# Patient Record
(demographics unavailable — no encounter records)

---

## 2025-04-23 NOTE — ASSESSMENT
[FreeTextEntry1] : 77yo female presents with tendinitis of right rotator cuff, impingement of right shoulder, cervicalgia   X-rays and diagnosis discussed with patient - unremarkable  MDP rx sent - r/b/a discussed - instructed how to take medication  instructed to attend PT  if symptoms persist could try LEONA CSI injection  if neck symptoms persist, return to dr. brenner for further workup  use of heat on the neck discussed use of cryotherapy on the shoulder discussed RTC prn    The patient's current medication management of their orthopedic diagnosis was reviewed today: (1) We discussed a comprehensive treatment plan that included pharmaceutical management involving the use of prescription medications. (2) There is a moderate risk of morbidity with further treatment, especially from use of prescription strength medications and possible side effects of these medications which include upset stomach with oral medications, skin reactions to topical medications and cardiac/renal/diabetes issues with long term use. (3) I recommended that the patient follow-up with their medical physician to discuss any significant specific potential issues with long term medication use such as interactions with current medications or with exacerbation of underlying medical comorbidities. (4) The benefits and risks associated with use of injectable, oral or topical, prescription and over the counter anti-inflammatory medications were discussed with the patient. The patient voiced understanding of the risks including but not limited to bleeding, stroke, kidney dysfunction, heart disease, and were referred to the black box warning label for further information.  I am working today under the supervision of Dr. Espinal and I am following the plan of care of Dr. Espinal as described by him on this date. Progress note completed by Enma Gaona PA-C under the supervision of Dr. Espinal.

## 2025-04-23 NOTE — HISTORY OF PRESENT ILLNESS
[9] : 9 [5] : 5 [Dull/Aching] : dull/aching [de-identified] : 4/23/25: patient states she has pain in her right shoulder for a few months, no injury  [FreeTextEntry1] : right shoulder

## 2025-04-23 NOTE — IMAGING
[Right] : right shoulder [There are no fractures, subluxations or dislocations. No significant abnormalities are seen] : There are no fractures, subluxations or dislocations. No significant abnormalities are seen [de-identified] : Shoulder Exam: Inspection: No swelling, no ecchymosis, no deformity, no scapular winging. Palpation: No tenderness to palpation over shoulder, AC joint or trapezius. Range of motion:  Forward flexion: Active 150 degrees Abduction: Active 130 degrees External rotation (with shoulder abducted): Passive 50 degrees Internal rotation (with shoulder abducted): Passive 50 degrees Strength: 5-/5 supraspinatus, 5-/5 infraspinatus and 5-/5 subscapularis. Special testing: positive macias test, positive impingement sign, negative Swain test, speeds and Yergason negative Motor and sensory intact distally

## 2025-05-21 NOTE — ASSESSMENT
[FreeTextEntry1] : 75yo female presents with tendinitis of right rotator cuff, impingement of right shoulder, cervicalgia   Took MDP with no relief  Will do R shoulder LEONA CSI injection today - patient elects to proceed - tolerated procedure well - post procedure precautions discussed will pause PT for now  will follow-up with dr. brenner for further workup of the neck  use of heat on the neck discussed ibuprofen 600mg rx sent to pharmacy - r/b/a discussed - instructed how to take medication - instructed that if GI upset occurs to stop medication use of cryotherapy on the shoulder discussed RTC prn    The patient's current medication management of their orthopedic diagnosis was reviewed today: (1) We discussed a comprehensive treatment plan that included pharmaceutical management involving the use of prescription medications. (2) There is a moderate risk of morbidity with further treatment, especially from use of prescription strength medications and possible side effects of these medications which include upset stomach with oral medications, skin reactions to topical medications and cardiac/renal/diabetes issues with long term use. (3) I recommended that the patient follow-up with their medical physician to discuss any significant specific potential issues with long term medication use such as interactions with current medications or with exacerbation of underlying medical comorbidities. (4) The benefits and risks associated with use of injectable, oral or topical, prescription and over the counter anti-inflammatory medications were discussed with the patient. The patient voiced understanding of the risks including but not limited to bleeding, stroke, kidney dysfunction, heart disease, and were referred to the black box warning label for further information.  I am working today under the supervision of Dr. Espinal and I am following the plan of care of Dr. Espinal as described by him on this date. Progress note completed by Enma Gaona PA-C under the supervision of Dr. Espinal.

## 2025-05-21 NOTE — IMAGING
[de-identified] : Shoulder Exam: Inspection: No swelling, no ecchymosis, no deformity, no scapular winging. Palpation: No tenderness to palpation over shoulder, AC joint or trapezius. Range of motion:  Forward flexion: Active 150 degrees Abduction: Active 130 degrees External rotation (with shoulder abducted): Passive 50 degrees Internal rotation (with shoulder abducted): Passive 50 degrees Strength: 5-/5 supraspinatus, 5-/5 infraspinatus and 5-/5 subscapularis. Special testing: positive macias test, positive impingement sign, negative Swain test, speeds and Yergason negative Motor and sensory intact distally

## 2025-05-23 NOTE — REASON FOR VISIT
on the discharge service for the patient. I have reviewed and made amendments to the documentation where necessary.
[Follow-Up Visit] : a follow-up pain management visit
[Follow-Up Visit] : a follow-up pain management visit

## 2025-05-28 NOTE — DATA REVIEWED
[MRI] : MRI [Cervical Spine] : cervical spine [Report was reviewed and noted in the chart] : The report was reviewed and noted in the chart [I independently reviewed and interpreted images and report] : I independently reviewed and interpreted images and report [FreeTextEntry1] : 12/10/2023: C1-C2 fusion s/p Type II dens fracture, hardware in place. C5-C6: Endplate and uncinate osteophytes w/ significant compression of LEFT C6 nerve root C6-C7: Endplate and uncinate osteophytes w/ significant compression of both C7 nerve roots

## 2025-05-28 NOTE — DISCUSSION/SUMMARY
[de-identified] : KENZIE PAEZ is a 76 year-old woman presenting for a RPV for a history of left-sided neck and upper back pain.   Prior treatment: 12/29/2023: C7-T1 NIKKO 80% relief pp with improved ROM and ADLS. Some soreness in L side of neck and rhomboid Oral steroid taper Tramadol Cyclobenzaprine TPI Physical therapy Diclofenac  Plan: 1) MRI cervical spine images reviewed with the patient. 2) Consider repeat C7-T1 NIKKO w/o MAC in the future 3) Obtain MRI right shoulder w/o contrast 4) Continue f/u with Dr. Espinal 5) Continue amitriptyline 25mg qhs for pain and sleep; denies AEs 6) Continue physical therapy 7) RTC post MRI

## 2025-05-28 NOTE — HISTORY OF PRESENT ILLNESS
[Upper back] : upper back [Right Arm] : right arm [4] : 4 [Radiating] : radiating [Sharp] : sharp [Shooting] : shooting [Intermittent] : intermittent [Household chores] : household chores [Leisure] : leisure [Sleep] : sleep [Ice] : ice [Heat] : heat [Injection therapy] : injection therapy [Neck] : neck [6] : 6 [5] : 5 [FreeTextEntry1] : 5/28/2025 KENZIE PAEZ is a 76 year-old woman presenting for a RPV for a history of chronic left sided neck and now right sided shoulder pain. Patient has been having right-sided shoulder pain. Received a CSI, oral steroid taper, and has done physical therapy for the past month. Continues to note significant pain in the right shoulder. Notes some short term improvement of pain in the right shoulder after the CSI.   The patient states that average pain over the past week was 5/10 in severity.  Mood: Patient continues to have depression. She takes bupropion. Sleep: Notes significant difficulty with sleep 2/2 pain.   3/26/2024: KENZIE PAEZ is a 75 year-old woman presenting for a RPV for a history of chronic left-sided neck and upper back pain. The patient notes having a recurrence of pain in the left upper back and posterior neck with radiation to the left posterolateral arm. No focal numbness or weakness in the upper extremities. No bowel or bladder incontinence or saddle anesthesia. Pain is worse with cervical rotation to the left. She notes having to take naproxen more frequently over the past few weeks.  The patient states that average pain over the past week was 8/10 in severity. Mood: Patient continues to have depression. She takes bupropion.  Sleep: Patient notes having significant disruption of sleep recently 2/2 pain.   3/13/2024: KENZIE PAEZ is a 75 year-old woman presenting for a RPV for a history of chronic left-sided neck and upper back pain. The patient continues to note significant improvement in her pain since her C7-T1 NIKKO on 12/29/2023. She notes occasional achiness but no severe pain in the neck or upper back. She notes that she has no difficulty with decreased range of motion in the neck. No focal numbness or weakness in the upper extremities. No bowel or bladder incontinence or saddle anesthesia. The patient states that average pain over the past week was 0/10 in severity. Mood: Patient notes ongoing depression for which she takes bupropion.  Sleep: Patient notes having longstanding history of insomnia. Has been tried zolpidem and melatonin in the past.  1/12/2024: KENZIE PAEZ is a 75 year-old woman presenting for a RPV for a history of chronic left sided neck and upper back pain. The patient underwent a C7-T1 NIKKO on 12/29/2023. NIKKO with 80% relief pp with improved ROM and ADLS. Still with some soreness in L side of neck and rhomboid. No focal numbness or weakness in the upper extremities. No bowel or bladder incontinence or saddle anesthesia. The patient states that average pain over the past week was 3/10 in severity. Mood: depression  Sleep: doesn't sleep at night which is unrelated to her pain   12/27/2023: KENZIE PAEZ is a 75 year-old woman presenting for a RPV for a history of left sided neck and upper back pain. She continues to endorse severe pain in the left posterior neck with radiation to the left posterior arm and dorsal forearm and into the hand. No focal numbness or weakness in the upper extremities. No bowel or bladder incontinence or saddle anesthesia. Pain is worse with cervical extension and left-sided rotation. She continues to do physical therapy. She tried taking diclofenac and did not note significant relief of her pain.  The patient states that average pain over the past week was 9/10 in severity. Mood: Patient has stable depression/anxiety. Takes bupropion.  Sleep: No difficulty with sleep initiation or maintenance 2/2 pain.   12/15/2023: KENZIE PAEZ is a 75 year-old woman presenting for a NPV for a history of left-sided neck and upper back pain. The patient notes having pain in the left posterior neck with radiation to the left posterior arm and dorsal forearm into the left hand. She notes having difficulty with fine movements of the left hand w/o focal numbness. No bowel or bladder incontinence or saddle anesthesia. Pain is worse when driving. Patient has a remote history of C1-C2 fusion.  The patient states that average pain over the past week was 10/10 in severity.   Mood: Patient notes stable depression/anxiety. Takes bupropion.  Sleep: Patient denies difficulty with sleep 2/2 pain but does have insomnia.    [] : no [FreeTextEntry7] : left shoulder into the left arm stops at the elbow  [FreeTextEntry8] : random activity  [de-identified] : random activity

## 2025-05-28 NOTE — PHYSICAL EXAM
[de-identified] : Gen: NAD Head: NC/AT Neck: limited cervical rotation to the left, +L rhomoboid tenderness and spasm  Eyes: no glasses, no scleral icterus ENT: mucous membranes moist CV: RRR, S1 S2, no mrg Lungs: CTAB, nonlabored breathing Abd: soft, NT/ND Ext: RUE: +empty can test positive, pain with internal rotation of the shoulder and limited ROM Neuro: CN intact UEs +5 L +5 R shoulder abduction +5 L +5 R arm abduction +5 L +5 R forearm flexion +5 L +5 R forearm extension +5 L +5 R finger flexion +5 L +5 R  strength Psych: normal affect Skin: no visible lesions

## 2025-05-28 NOTE — PHYSICAL EXAM
[de-identified] : Gen: NAD Head: NC/AT Neck: limited cervical rotation to the left, +L rhomoboid tenderness and spasm  Eyes: no glasses, no scleral icterus ENT: mucous membranes moist CV: RRR, S1 S2, no mrg Lungs: CTAB, nonlabored breathing Abd: soft, NT/ND Ext: RUE: +empty can test positive, pain with internal rotation of the shoulder and limited ROM Neuro: CN intact UEs +5 L +5 R shoulder abduction +5 L +5 R arm abduction +5 L +5 R forearm flexion +5 L +5 R forearm extension +5 L +5 R finger flexion +5 L +5 R  strength Psych: normal affect Skin: no visible lesions

## 2025-05-28 NOTE — DISCUSSION/SUMMARY
[de-identified] : KENZIE PAEZ is a 76 year-old woman presenting for a RPV for a history of left-sided neck and upper back pain.   Prior treatment: 12/29/2023: C7-T1 NIKKO 80% relief pp with improved ROM and ADLS. Some soreness in L side of neck and rhomboid Oral steroid taper Tramadol Cyclobenzaprine TPI Physical therapy Diclofenac  Plan: 1) MRI cervical spine images reviewed with the patient. 2) Consider repeat C7-T1 NIKKO w/o MAC in the future 3) Obtain MRI right shoulder w/o contrast 4) Continue f/u with Dr. Espinal 5) Continue amitriptyline 25mg qhs for pain and sleep; denies AEs 6) Continue physical therapy 7) RTC post MRI

## 2025-06-11 NOTE — PHYSICAL EXAM
[de-identified] : Gen: NAD Head: NC/AT Neck: limited cervical rotation to the left, +L rhomoboid tenderness and spasm  Eyes: no glasses, no scleral icterus ENT: mucous membranes moist CV: RRR, S1 S2, no mrg Lungs: CTAB, nonlabored breathing Abd: soft, NT/ND Ext: RUE: +empty can test positive, pain with internal rotation of the shoulder and limited ROM Neuro: CN intact UEs +5 L +5 R shoulder abduction +5 L +5 R arm abduction +5 L +5 R forearm flexion +5 L +5 R forearm extension +5 L +5 R finger flexion +5 L +5 R  strength Psych: normal affect Skin: no visible lesions

## 2025-06-11 NOTE — HISTORY OF PRESENT ILLNESS
[Neck] : neck [Upper back] : upper back [Right Arm] : right arm [9] : 9 [3] : 3 [Radiating] : radiating [Sharp] : sharp [Shooting] : shooting [Intermittent] : intermittent [Household chores] : household chores [Leisure] : leisure [Sleep] : sleep [Ice] : ice [Heat] : heat [Injection therapy] : injection therapy [FreeTextEntry1] : 6/11/2025 KENZIE PAEZ is a 76 year-old woman presenting for a RPV for a history of chronic left sided neck and now right shoulder pain. Continues to do PT, notes severe pain in the right anterior and lateral shoulder. Notes pain with a variety of movements, particularly with reaching with the right arm.  The patient states that average pain over the past week was 6/10 in severity. Mood: Patient continues to have depression. She takes bupropion. Sleep: Notes significant difficulty with sleep 2/2 pain.     5/28/2025 KENZIE PAEZ is a 76 year-old woman presenting for a RPV for a history of chronic left sided neck and now right sided shoulder pain. Patient has been having right-sided shoulder pain. Received a CSI, oral steroid taper, and has done physical therapy for the past month. Continues to note significant pain in the right shoulder. Notes some short term improvement of pain in the right shoulder after the CSI.   The patient states that average pain over the past week was 5/10 in severity.  Mood: Patient continues to have depression. She takes bupropion. Sleep: Notes significant difficulty with sleep 2/2 pain.   3/26/2024: KENZIE PAEZ is a 75 year-old woman presenting for a RPV for a history of chronic left-sided neck and upper back pain. The patient notes having a recurrence of pain in the left upper back and posterior neck with radiation to the left posterolateral arm. No focal numbness or weakness in the upper extremities. No bowel or bladder incontinence or saddle anesthesia. Pain is worse with cervical rotation to the left. She notes having to take naproxen more frequently over the past few weeks.  The patient states that average pain over the past week was 8/10 in severity. Mood: Patient continues to have depression. She takes bupropion.  Sleep: Patient notes having significant disruption of sleep recently 2/2 pain.   3/13/2024: KENZIE PAEZ is a 75 year-old woman presenting for a RPV for a history of chronic left-sided neck and upper back pain. The patient continues to note significant improvement in her pain since her C7-T1 NIKKO on 12/29/2023. She notes occasional achiness but no severe pain in the neck or upper back. She notes that she has no difficulty with decreased range of motion in the neck. No focal numbness or weakness in the upper extremities. No bowel or bladder incontinence or saddle anesthesia. The patient states that average pain over the past week was 0/10 in severity. Mood: Patient notes ongoing depression for which she takes bupropion.  Sleep: Patient notes having longstanding history of insomnia. Has been tried zolpidem and melatonin in the past.  1/12/2024: KENZIE PAEZ is a 75 year-old woman presenting for a RPV for a history of chronic left sided neck and upper back pain. The patient underwent a C7-T1 NIKKO on 12/29/2023. NIKKO with 80% relief pp with improved ROM and ADLS. Still with some soreness in L side of neck and rhomboid. No focal numbness or weakness in the upper extremities. No bowel or bladder incontinence or saddle anesthesia. The patient states that average pain over the past week was 3/10 in severity. Mood: depression  Sleep: doesn't sleep at night which is unrelated to her pain   12/27/2023: KENZIE PAEZ is a 75 year-old woman presenting for a RPV for a history of left sided neck and upper back pain. She continues to endorse severe pain in the left posterior neck with radiation to the left posterior arm and dorsal forearm and into the hand. No focal numbness or weakness in the upper extremities. No bowel or bladder incontinence or saddle anesthesia. Pain is worse with cervical extension and left-sided rotation. She continues to do physical therapy. She tried taking diclofenac and did not note significant relief of her pain.  The patient states that average pain over the past week was 9/10 in severity. Mood: Patient has stable depression/anxiety. Takes bupropion.  Sleep: No difficulty with sleep initiation or maintenance 2/2 pain.   12/15/2023: KENZIE PAEZ is a 75 year-old woman presenting for a NPV for a history of left-sided neck and upper back pain. The patient notes having pain in the left posterior neck with radiation to the left posterior arm and dorsal forearm into the left hand. She notes having difficulty with fine movements of the left hand w/o focal numbness. No bowel or bladder incontinence or saddle anesthesia. Pain is worse when driving. Patient has a remote history of C1-C2 fusion.  The patient states that average pain over the past week was 10/10 in severity.   Mood: Patient notes stable depression/anxiety. Takes bupropion.  Sleep: Patient denies difficulty with sleep 2/2 pain but does have insomnia.     [] : no [FreeTextEntry7] : left shoulder into the left arm stops at the elbow  [FreeTextEntry8] : random activity  [de-identified] : random activity

## 2025-06-11 NOTE — DISCUSSION/SUMMARY
[de-identified] : KENZIE PAEZ is a 76 year-old woman presenting for a RPV for a history of left-sided neck and upper back pain.   Prior treatment: 12/29/2023: C7-T1 NIKKO 80% relief pp with improved ROM and ADLS. Some soreness in L side of neck and rhomboid Oral steroid taper Tramadol Cyclobenzaprine TPI Physical therapy Diclofenac  Plan: 1) MRI cervical spine images reviewed with the patient. 2) Consider repeat C7-T1 NIKKO w/o MAC in the future 3) Reviewed MRI right shoulder findings with the patient 4) Continue f/u with Dr. Espinal, consider CSI vs surgical treatment 5) Continue amitriptyline 25mg qhs for pain and sleep; denies AEs 6) Continue physical therapy 7) RTC prn

## 2025-06-18 NOTE — IMAGING
[de-identified] : Shoulder Exam Palpation: Tenderness to palpation over greater tuberosity Passive FF: 180; ER: 90: IR: 70; ER at the side 50 Strength: 5-/5 supraspinatus, infraspinatus and subscapularis; there is pain with strength testing Special testing: Positive Palomares test, positive impingement sign; Speeds and yergason negative Neuro: Motor and sensory intact distally

## 2025-06-18 NOTE — ASSESSMENT
[FreeTextEntry1] : I personally reviewed and interpreted the MRI images in detail.  Incomplete insertional small partial rotator cuff tearing in the setting of degenerative changes in the shoulder.  Also mild glenohumeral OA and effusion noted as well.  She does have some fluid around the biceps sheath as well.  Again nothing surgical.  Discussed the findings are age-appropriate.  A corticosteroid injection is planned today in the glenohumeral joint.  Continue physical therapy advised.  If still symptomatic in 6 weeks we discussed biceps sheath injection.  .

## 2025-06-18 NOTE — IMAGING
[de-identified] : Shoulder Exam Palpation: Tenderness to palpation over greater tuberosity Passive FF: 180; ER: 90: IR: 70; ER at the side 50 Strength: 5-/5 supraspinatus, infraspinatus and subscapularis; there is pain with strength testing Special testing: Positive Palomares test, positive impingement sign; Speeds and yergason negative Neuro: Motor and sensory intact distally

## 2025-06-18 NOTE — PROCEDURE
[FreeTextEntry3] : -Large joint injection was performed of the right shoulder glenohumeral joint -The indication for this procedure was pain, inflammation and x-ray evidence of Osteoarthritis on this or prior visit. The site was prepped with alcohol, betadine, ethyl chloride sprayed topically and sterile technique used. - An injection of 2ml of Betamethasone (30mg/5ml); 5ml Marcaine (0.5%) injected -Patient was advised to call if redness, pain or fever occur, apply ice for 15 minutes out of every hour for the next 12-24 hours as tolerated and patient was advised to rest the joint(s) for 2 days. Patient has tried OTC's including aspirin, Ibuprofen, Aleve, etc or prescription NSAIDS, and/or exercises at home and/or physical therapy without satisfactory response, patient had decreased mobility in the joint and the risks benefits, and alternatives have been discussed, and verbal consent was obtained. -Ultrasound guidance was indicated for this patient for glenohumeral injection. All ultrasound images have been permanently captured and stored accordingly in our picture archiving and communication system. Visualization of the needle and placement of injection was performed without complication.

## 2025-06-18 NOTE — HISTORY OF PRESENT ILLNESS
[9] : 9 [0] : 0 [Dull/Aching] : dull/aching [Localized] : localized [Sharp] : sharp [] : yes [FreeTextEntry1] : R shoulder [FreeTextEntry5] : Pt is here for follow up, has MRI done. [de-identified] : MRI

## 2025-06-18 NOTE — HISTORY OF PRESENT ILLNESS
[9] : 9 [0] : 0 [Dull/Aching] : dull/aching [Localized] : localized [Sharp] : sharp [] : yes [FreeTextEntry1] : R shoulder [FreeTextEntry5] : Pt is here for follow up, has MRI done. [de-identified] : MRI